# Patient Record
Sex: MALE | Race: BLACK OR AFRICAN AMERICAN | NOT HISPANIC OR LATINO | Employment: OTHER | URBAN - METROPOLITAN AREA
[De-identification: names, ages, dates, MRNs, and addresses within clinical notes are randomized per-mention and may not be internally consistent; named-entity substitution may affect disease eponyms.]

---

## 2017-04-22 ENCOUNTER — HOSPITAL ENCOUNTER (EMERGENCY)
Facility: HOSPITAL | Age: 67
Discharge: HOME | End: 2017-04-22
Attending: EMERGENCY MEDICINE
Payer: COMMERCIAL

## 2017-04-22 VITALS
SYSTOLIC BLOOD PRESSURE: 158 MMHG | TEMPERATURE: 98 F | OXYGEN SATURATION: 95 % | HEIGHT: 74 IN | WEIGHT: 145 LBS | HEART RATE: 92 BPM | BODY MASS INDEX: 18.61 KG/M2 | RESPIRATION RATE: 22 BRPM | DIASTOLIC BLOOD PRESSURE: 70 MMHG

## 2017-04-22 DIAGNOSIS — R05.9 COUGH: ICD-10-CM

## 2017-04-22 DIAGNOSIS — J44.1 COPD EXACERBATION: Primary | ICD-10-CM

## 2017-04-22 DIAGNOSIS — S49.91XA RIGHT SHOULDER INJURY, INITIAL ENCOUNTER: ICD-10-CM

## 2017-04-22 LAB
ALBUMIN SERPL BCP-MCNC: 3.7 G/DL
ALP SERPL-CCNC: 203 U/L
ALT SERPL W/O P-5'-P-CCNC: 7 U/L
ANION GAP SERPL CALC-SCNC: 10 MMOL/L
AST SERPL-CCNC: 18 U/L
BASOPHILS # BLD AUTO: 0 K/UL
BASOPHILS NFR BLD: 0.1 %
BILIRUB SERPL-MCNC: 0.7 MG/DL
BUN SERPL-MCNC: 9 MG/DL
CALCIUM SERPL-MCNC: 9.4 MG/DL
CHLORIDE SERPL-SCNC: 93 MMOL/L
CO2 SERPL-SCNC: 27 MMOL/L
CREAT SERPL-MCNC: 1 MG/DL
DIFFERENTIAL METHOD: ABNORMAL
EOSINOPHIL # BLD AUTO: 0.1 K/UL
EOSINOPHIL NFR BLD: 0.7 %
ERYTHROCYTE [DISTWIDTH] IN BLOOD BY AUTOMATED COUNT: 13.5 %
EST. GFR  (AFRICAN AMERICAN): >60 ML/MIN/1.73 M^2
EST. GFR  (NON AFRICAN AMERICAN): >60 ML/MIN/1.73 M^2
GLUCOSE SERPL-MCNC: 105 MG/DL
HCT VFR BLD AUTO: 34.3 %
HGB BLD-MCNC: 11.5 G/DL
LACTATE SERPL-SCNC: 1.3 MMOL/L
LYMPHOCYTES # BLD AUTO: 1.5 K/UL
LYMPHOCYTES NFR BLD: 16.7 %
MCH RBC QN AUTO: 32.3 PG
MCHC RBC AUTO-ENTMCNC: 33.5 %
MCV RBC AUTO: 96 FL
MONOCYTES # BLD AUTO: 0.2 K/UL
MONOCYTES NFR BLD: 2.1 %
NEUTROPHILS # BLD AUTO: 7.1 K/UL
NEUTROPHILS NFR BLD: 80.4 %
PLATELET # BLD AUTO: 632 K/UL
PMV BLD AUTO: 5.8 FL
POTASSIUM SERPL-SCNC: 4.2 MMOL/L
PROT SERPL-MCNC: 7.7 G/DL
RBC # BLD AUTO: 3.57 M/UL
SODIUM SERPL-SCNC: 130 MMOL/L
WBC # BLD AUTO: 8.8 K/UL

## 2017-04-22 PROCEDURE — 63600175 PHARM REV CODE 636 W HCPCS: Performed by: EMERGENCY MEDICINE

## 2017-04-22 PROCEDURE — 85025 COMPLETE CBC W/AUTO DIFF WBC: CPT

## 2017-04-22 PROCEDURE — 94640 AIRWAY INHALATION TREATMENT: CPT

## 2017-04-22 PROCEDURE — 36415 COLL VENOUS BLD VENIPUNCTURE: CPT

## 2017-04-22 PROCEDURE — 25000242 PHARM REV CODE 250 ALT 637 W/ HCPCS: Performed by: EMERGENCY MEDICINE

## 2017-04-22 PROCEDURE — 96365 THER/PROPH/DIAG IV INF INIT: CPT

## 2017-04-22 PROCEDURE — 87040 BLOOD CULTURE FOR BACTERIA: CPT

## 2017-04-22 PROCEDURE — 83605 ASSAY OF LACTIC ACID: CPT

## 2017-04-22 PROCEDURE — 80053 COMPREHEN METABOLIC PANEL: CPT

## 2017-04-22 PROCEDURE — 99284 EMERGENCY DEPT VISIT MOD MDM: CPT | Mod: 25

## 2017-04-22 PROCEDURE — 25000003 PHARM REV CODE 250: Performed by: EMERGENCY MEDICINE

## 2017-04-22 RX ORDER — MOXIFLOXACIN HYDROCHLORIDE 400 MG/250ML
400 INJECTION, SOLUTION INTRAVENOUS
Status: COMPLETED | OUTPATIENT
Start: 2017-04-22 | End: 2017-04-22

## 2017-04-22 RX ORDER — HYDROCODONE BITARTRATE AND ACETAMINOPHEN 7.5; 325 MG/15ML; MG/15ML
SOLUTION ORAL 4 TIMES DAILY PRN
COMMUNITY

## 2017-04-22 RX ORDER — HYDROCODONE BITARTRATE AND ACETAMINOPHEN 5; 325 MG/1; MG/1
1 TABLET ORAL
Status: COMPLETED | OUTPATIENT
Start: 2017-04-22 | End: 2017-04-22

## 2017-04-22 RX ORDER — BICALUTAMIDE 50 MG/1
50 TABLET, FILM COATED ORAL DAILY
COMMUNITY

## 2017-04-22 RX ORDER — QUETIAPINE FUMARATE 50 MG/1
50 TABLET, FILM COATED ORAL NIGHTLY
COMMUNITY

## 2017-04-22 RX ORDER — ALBUTEROL SULFATE 90 UG/1
2 AEROSOL, METERED RESPIRATORY (INHALATION) EVERY 4 HOURS PRN
Qty: 1 INHALER | Refills: 0 | Status: SHIPPED | OUTPATIENT
Start: 2017-04-22

## 2017-04-22 RX ORDER — MOXIFLOXACIN HYDROCHLORIDE 400 MG/1
400 TABLET ORAL DAILY
Qty: 7 TABLET | Refills: 0 | Status: SHIPPED | OUTPATIENT
Start: 2017-04-22 | End: 2017-04-29

## 2017-04-22 RX ORDER — IPRATROPIUM BROMIDE AND ALBUTEROL SULFATE 2.5; .5 MG/3ML; MG/3ML
3 SOLUTION RESPIRATORY (INHALATION)
Status: COMPLETED | OUTPATIENT
Start: 2017-04-22 | End: 2017-04-22

## 2017-04-22 RX ORDER — GUAIFENESIN 200 MG/1
400 TABLET ORAL EVERY 4 HOURS PRN
COMMUNITY

## 2017-04-22 RX ORDER — METHYLPREDNISOLONE 4 MG/1
TABLET ORAL
Qty: 1 PACKAGE | Refills: 0 | Status: SHIPPED | OUTPATIENT
Start: 2017-04-22 | End: 2017-05-13

## 2017-04-22 RX ADMIN — IPRATROPIUM BROMIDE AND ALBUTEROL SULFATE 3 ML: .5; 3 SOLUTION RESPIRATORY (INHALATION) at 04:04

## 2017-04-22 RX ADMIN — MOXIFLOXACIN HYDROCHLORIDE 400 MG: 400 INJECTION, SOLUTION INTRAVENOUS at 04:04

## 2017-04-22 RX ADMIN — HYDROCODONE BITARTRATE AND ACETAMINOPHEN 1 TABLET: 5; 325 TABLET ORAL at 05:04

## 2017-04-22 NOTE — ED AVS SNAPSHOT
[facility] [address]  [place] [address]               Billy Starks   2017  3:07 PM   ED    Description:  Male : 1950   Department:  [facility]           Your Care was Coordinated By:     Provider Role From To    Michael Gomes MD Attending Provider [phone] --      Reason for Visit     Cough     Shoulder Pain           Diagnoses this Visit        Comments    COPD exacerbation    -  Primary     Cough         Right shoulder injury, initial encounter           ED Disposition     None           To Do List           Follow-up Information     Follow up with your doctor. Call in 3 days.    Why:  As needed       These Medications        Disp Refills Start End    moxifloxacin (AVELOX ABC PACK) 400 mg tablet 7 tablet 0 2017    Take 1 tablet (400 mg total) by mouth once daily. - Oral    albuterol 90 mcg/actuation inhaler 1 Inhaler 0 2017     Inhale 2 puffs into the lungs every 4 (four) hours as needed for Wheezing or Shortness of Breath. - Inhalation    methylPREDNISolone (MEDROL DOSEPACK) 4 mg tablet 1 Package 0 2017    Take as directed      [facility] On Call     [facility] On Call Nurse Care Line -  Assistance  Unless otherwise directed by your provider, please contact [facility], our nurse care line that is available for  assistance.     Registered nurses in the [facility] provide: appointment scheduling, clinical advisement, health education, and other advisory services.  Call: [phone] (toll free)               Medications           Message regarding Medications     Verify the changes and/or additions to your medication regime listed below are the same as discussed with your clinician today.  If any of these changes or additions are incorrect, please notify your healthcare provider.        START taking these NEW medications        Refills    moxifloxacin (AVELOX ABC  PACK) 400 mg tablet 0    Sig: Take 1 tablet (400 mg total) by mouth once daily.    Class: Print    Route: Oral    albuterol 90 mcg/actuation inhaler 0    Sig: Inhale 2 puffs into the lungs every 4 (four) hours as needed for Wheezing or Shortness of Breath.    Class: Print    Route: Inhalation    methylPREDNISolone (MEDROL DOSEPACK) 4 mg tablet 0    Sig: Take as directed    Class: Print      These medications were administered today        Dose Freq    albuterol-ipratropium 2.5mg-0.5mg/3mL nebulizer solution 3 mL 3 mL Every 5 min    Sig: Take 3 mLs by nebulization every 5 (five) minutes.    Class: Normal    Route: Nebulization    moxifloxacin 400 mg/250 mL IVPB 400 mg 400 mg ED 1 Time    Sig: Inject 250 mLs (400 mg total) into the vein ED 1 Time.    Class: Normal    Route: Intravenous           Verify that the below list of medications is an accurate representation of the medications you are currently taking.  If none reported, the list may be blank. If incorrect, please contact your healthcare provider. Carry this list with you in case of emergency.           Current Medications     bicalutamide (CASODEX) 50 MG Tab Take 50 mg by mouth once daily.    guaifenesin 200 mg tablet Take 400 mg by mouth every 4 (four) hours as needed for Congestion.    hydrocodone-acetaminophen (HYCET) solution 7.5-325 mg/15mL Take by mouth 4 (four) times daily as needed for Pain.    quetiapine (SEROQUEL) 50 MG tablet Take 50 mg by mouth every evening.    albuterol 90 mcg/actuation inhaler Inhale 2 puffs into the lungs every 4 (four) hours as needed for Wheezing or Shortness of Breath.    methylPREDNISolone (MEDROL DOSEPACK) 4 mg tablet Take as directed    moxifloxacin (AVELOX ABC PACK) 400 mg tablet Take 1 tablet (400 mg total) by mouth once daily.    moxifloxacin 400 mg/250 mL IVPB 400 mg Inject 250 mLs (400 mg total) into the vein ED 1 Time.           Clinical Reference Information           Your Vitals Were     BP Pulse Temp Resp Height  "Weight    154/81 (BP Location: Right arm, Patient Position: Sitting) 81 98.3 °F (36.8 °C) (Oral) 20 6' 2" (1.88 m) 65.8 kg (145 lb)    SpO2 BMI             95% 18.62 kg/m2         Allergies as of 4/22/2017     No Known Allergies      Immunizations Administered on Date of Encounter - 4/22/2017     None      ED Micro, Lab, POCT     Start Ordered       Status Ordering Provider    04/22/17 1530 04/22/17 1529  Complete Blood Count (CBC)  STAT      Final result     04/22/17 1530 04/22/17 1529  Comprehensive Metabolic Panel (CMP)  STAT      Final result     04/22/17 1530 04/22/17 1529  Blood culture #1 **CANNOT BE ORDERED STAT**  Once      In process     04/22/17 1530 04/22/17 1529  Blood culture #2 **CANNOT BE ORDERED STAT**  Once      In process     04/22/17 1530 04/22/17 1529  Lactic acid, plasma  STAT      Final result       ED Imaging Orders     Start Ordered       Status Ordering Provider    04/22/17 1705 04/22/17 1704  X-Ray Shoulder Trauma Right  1 time imaging      Ordered     04/22/17 1530 04/22/17 1529  X-Ray Chest PA And Lateral  1 time imaging      Final result       Discharge References/Attachments     COPD FLARE (ENGLISH)      [facility] Sign-Up     Activating your [facility] account is as easy as 1-2-3!     1) Visit [email or web address], select Sign Up Now, enter this activation code and your date of birth, then select Next.  [email or web address]  Expires: 6/6/2017  5:07 PM      2) Create a username and password to use when you visit [facility] in the future and select a security question in case you lose your password and select Next.    3) Enter your e-mail address and click Sign Up!    Additional Information  If you have questions, please e-mail [email or web address] or call [phone] to talk to our [facility] staff. Remember, [facility] is NOT to be used for urgent needs. For medical emergencies, dial 911.          [facility] complies with applicable Federal civil rights laws and does not " discriminate on the basis of race, color, national origin, age, disability, or sex.        Language Assistance Services     ATTENTION: Language assistance services are available, free of charge. Please call [phone].      ATENCIÓN: Si ulises cason, tiene a jenkins disposición servicios gratuitos de asistencia lingüística. Llame al [phone].     CHÚ Ý: N?u b?n nói Ti?ng Vi?t, có các d?ch v? h? tr? ngôn ng? mi?n phí dành cho b?n. G?i s? [phone].

## 2017-04-22 NOTE — ED PROVIDER NOTES
Encounter Date: 4/22/2017    SCRIBE #1 NOTE: I, Mayuri Curran, am scribing for, and in the presence of, Dr. Gomes.       History     Chief Complaint   Patient presents with    Cough     productive cough, green sputum.    Shoulder Pain     Fell 2 weeks ago, R shoulder and elbow pain.     Review of patient's allergies indicates:  No Known Allergies  HPI Comments:   04/22/2017 3:22 PM     Chief Complaint: Cough, shoulder pain, & elbow pain      The patient is a 66 y.o. male with prostate cancer and undergoing chemotherapy who presents to the ED with an onset of persistent productive cough with thick sputum for the last 2 weeks. He states that the sputum has changed colors from green to yellow to now red. The patient also endorses worsening right shoulder pain and right elbow pain over the last 2 weeks after he fell and hit the TV stand. Currently, the pain is rated 7/10 but is temporarily alleviated with hot showers. The patient denies being on a recent course of antibiotics, fever, chills, or any other symptoms at this time. No SHx noted.     The history is provided by the patient.     History reviewed. No pertinent past medical history.  History reviewed. No pertinent surgical history.  History reviewed. No pertinent family history.  Social History   Substance Use Topics    Smoking status: Current Every Day Smoker     Packs/day: 1.00     Types: Cigarettes    Smokeless tobacco: None    Alcohol use Yes     Review of Systems   Constitutional: Negative for chills and fever.   HENT: Negative for nosebleeds.    Eyes: Negative for visual disturbance.   Respiratory: Positive for cough (productive).    Cardiovascular: Negative for chest pain and palpitations.   Gastrointestinal: Negative for abdominal pain, diarrhea, nausea and vomiting.   Genitourinary: Negative for dysuria and hematuria.   Musculoskeletal: Positive for arthralgias (right shoulder and right elbow).   Skin: Negative for rash.   Neurological: Negative for  seizures, syncope and headaches.     Physical Exam   Initial Vitals   BP Pulse Resp Temp SpO2   04/22/17 1503 04/22/17 1503 04/22/17 1503 04/22/17 1503 04/22/17 1503   154/81 95 14 98.3 °F (36.8 °C) 95 %     Physical Exam    Nursing note and vitals reviewed.  Constitutional: He appears well-developed and well-nourished. He is not diaphoretic. No distress.   HENT:   Head: Normocephalic and atraumatic.   Mouth/Throat: Oropharynx is clear and moist.   Eyes: Conjunctivae are normal. Pupils are equal, round, and reactive to light.   Neck: Normal range of motion. Neck supple.   Cardiovascular: Normal rate, regular rhythm, normal heart sounds and intact distal pulses.   Pulmonary/Chest: No respiratory distress. He has wheezes. He has no rhonchi. He has no rales.   Mild expiratory wheezing in the right upper lung field.    Abdominal: Soft. Bowel sounds are normal. He exhibits no distension. There is no tenderness.   Musculoskeletal: Normal range of motion. He exhibits no edema or tenderness.   Lymphadenopathy:     He has no cervical adenopathy.   Neurological: He is alert and oriented to person, place, and time. He has normal strength.   Skin: Skin is warm and dry.   Psychiatric: He has a normal mood and affect. Thought content normal.       ED Course   Procedures  Labs Reviewed   CBC W/ AUTO DIFFERENTIAL - Abnormal; Notable for the following:        Result Value    RBC 3.57 (*)     Hemoglobin 11.5 (*)     Hematocrit 34.3 (*)     MCH 32.3 (*)     Platelets 632 (*)     MPV 5.8 (*)     Mono # 0.2 (*)     Gran% 80.4 (*)     Lymph% 16.7 (*)     Mono% 2.1 (*)     All other components within normal limits   COMPREHENSIVE METABOLIC PANEL - Abnormal; Notable for the following:     Sodium 130 (*)     Chloride 93 (*)     Alkaline Phosphatase 203 (*)     ALT 7 (*)     All other components within normal limits   CULTURE, BLOOD   CULTURE, BLOOD   LACTIC ACID, PLASMA     Imaging Results         X-Ray Shoulder Trauma Right (Final result)  Result time:  04/23/17 08:10:07    Final result by Leno Rasmussen MD (04/23/17 08:10:07)    Impression:      1.  No radiographic evidence for acute traumatic right shoulder injury.      Electronically signed by: Leno Rasmussen MD  Date:     04/23/17  Time:    08:10     Narrative:    Comparison: None    Technique: AP internally and externally rotated and scapular Y radiographs of the right shoulder.    Findings: No acute fracture or dislocation is seen. No focal osseous lesion is seen. No radiopaque foreign body is seen. No bone erosion is seen. Joint spaces are maintained. The visualized right lung is clear. There are mildly increased chronic interstitial markings in the right lung.            X-Ray Chest PA And Lateral (Final result) Result time:  04/22/17 15:53:43    Final result by Lneo Rasmussen MD (04/22/17 15:53:43)    Impression:      1.  No acute radiographic findings in the chest.  2. Pulmonary emphysema.        Electronically signed by: Leno Rasmussen MD  Date:     04/22/17  Time:    15:53     Narrative:    Comparison: None available    Technique: PA and lateral chest radiographs.    Findings:There is flattening of the diaphragm and pulmonary hyperinflation/hyperlucency. The cardiac size is within normal limits. No alveolar consolidation, lateral effusion or pneumothorax is seen. There is atherosclerosis in the aortic arch. Mild degenerative disc disease is seen. No radiographically apparent pulmonary nodule is seen. Nipple shadows project over the lower chest on the PA view.                  Medical Decision Making:   History:   Old Medical Records: I decided to obtain old medical records.  Initial Assessment:   This is an emergent evaluation of a 66 y.o.male patient with presentation of cough and shoulder pain.   Initial differentials include but are not limited to: Bronchitis, pneumonia, fracture or subluxation.   Plan: Chest x-ray, EKG, labs including blood cultures and lactic acid,  IV antibiotics  Clinical Tests:   Lab Tests: Reviewed and Ordered  Radiological Study: Reviewed and Ordered            Scribe Attestation:   Scribe #1: I performed the above scribed service and the documentation accurately describes the services I performed. I attest to the accuracy of the note.    Attending Attestation:           Physician Attestation for Scribe:  Physician Attestation Statement for Scribe #1: I, Dr. Gomes, reviewed documentation, as scribed by Mayuri Curran in my presence, and it is both accurate and complete.         Attending ED Notes:   Workup unremarkable. Will continue abx as outpatient. Pt afebrile, no acute respiratory distress, not hypoxic. Results, clinical impression and rx discussed with patient.  Pt is to call for follow up with PCP in 2-3 days or return to the ED for any new or worsening symptoms, or for any other concerns. Return precautions given. Pt expressed understanding.           ED Course     Clinical Impression:     1. COPD exacerbation    2. Cough    3. Right shoulder injury, initial encounter          Disposition:   Disposition: Discharged  Condition: Stable       Michael Gomes MD  04/25/17 0046

## 2017-04-28 LAB
BACTERIA BLD CULT: NORMAL
BACTERIA BLD CULT: NORMAL

## 2017-05-10 ENCOUNTER — TELEPHONE (OUTPATIENT)
Dept: PAIN MEDICINE | Facility: CLINIC | Age: 67
End: 2017-05-10

## 2017-05-10 NOTE — TELEPHONE ENCOUNTER
----- Message from Vivian Lezama sent at 5/10/2017 10:30 AM CDT -----  Contact: Patient  Patient states that he is in a lot of pain in his left shoulder and would like to be seen sooner than 05/26/2017.  He is also asking questions about the pain management clinic.  Can you please call [phone].  Thank you

## 2017-05-15 ENCOUNTER — HOSPITAL ENCOUNTER (EMERGENCY)
Facility: HOSPITAL | Age: 67
Discharge: HOME | End: 2017-05-15
Attending: EMERGENCY MEDICINE
Payer: OTHER GOVERNMENT

## 2017-05-15 VITALS
RESPIRATION RATE: 16 BRPM | BODY MASS INDEX: 17.97 KG/M2 | WEIGHT: 140 LBS | DIASTOLIC BLOOD PRESSURE: 78 MMHG | SYSTOLIC BLOOD PRESSURE: 138 MMHG | HEIGHT: 74 IN | TEMPERATURE: 98 F | HEART RATE: 96 BPM

## 2017-05-15 DIAGNOSIS — M79.601 RIGHT ARM PAIN: Primary | ICD-10-CM

## 2017-05-15 PROCEDURE — 63600175 PHARM REV CODE 636 W HCPCS: Performed by: EMERGENCY MEDICINE

## 2017-05-15 PROCEDURE — 96372 THER/PROPH/DIAG INJ SC/IM: CPT | Mod: 25

## 2017-05-15 PROCEDURE — 99283 EMERGENCY DEPT VISIT LOW MDM: CPT | Mod: 25

## 2017-05-15 RX ORDER — MORPHINE SULFATE 2 MG/ML
6 INJECTION, SOLUTION INTRAMUSCULAR; INTRAVENOUS
Status: DISCONTINUED | OUTPATIENT
Start: 2017-05-15 | End: 2017-05-15

## 2017-05-15 RX ORDER — MORPHINE SULFATE 10 MG/ML
INJECTION INTRAMUSCULAR; INTRAVENOUS; SUBCUTANEOUS
Status: DISCONTINUED
Start: 2017-05-15 | End: 2017-05-15 | Stop reason: HOSPADM

## 2017-05-15 RX ORDER — MORPHINE SULFATE 10 MG/ML
10 INJECTION INTRAMUSCULAR; INTRAVENOUS; SUBCUTANEOUS
Status: DISCONTINUED | OUTPATIENT
Start: 2017-05-15 | End: 2017-05-15

## 2017-05-15 RX ORDER — MORPHINE SULFATE 10 MG/ML
6 INJECTION, SOLUTION INTRAMUSCULAR; INTRAVENOUS
Status: COMPLETED | OUTPATIENT
Start: 2017-05-15 | End: 2017-05-15

## 2017-05-15 RX ADMIN — MORPHINE SULFATE 6 MG: 10 INJECTION INTRAMUSCULAR; INTRAVENOUS; SUBCUTANEOUS at 03:05
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
## 2017-05-15 NOTE — ED AVS SNAPSHOT
[facility] [address]  [place] [address]               Billy Starks   5/15/2017  2:10 PM   ED    Description:  Male : 1950   Department:  [facility]           Your Care was Coordinated By:     Provider Role From To    Rigoberto Boyd MD Attending Provider 05/15/17 1424 --    Eileen Juárez PA-C Physician Assistant 05/15/17 5268 --      Reason for Visit     Arm Pain           Diagnoses this Visit        Comments    Right arm pain    -  Primary       ED Disposition     ED Disposition Condition Comment    Discharge             To Do List           Follow-up Information     Please follow up.    Why:  Call the [facility] at [phone] to establish care with a primary care provider        Schedule an appointment as soon as possible for a visit with Humberto Salmeron MD.    Specialties:  Sports Medicine, Orthopedic Surgery    Contact information:    [address] [place] [address] [address] [place] [address]  [phone]          Follow up with [facility].    Specialty:  Emergency Medicine    Why:  As needed    Contact information:    [address] [place] [address] [place]  [facility] [address] [phone]      [facility]     [facility] Nurse Care Line -  Assistance  Unless otherwise directed by your provider, please contact [facility], our nurse care line that is available for  assistance.     Registered nurses in the [facility] provide: appointment scheduling, clinical advisement, health education, and other advisory services.  Call: [phone] (toll free)               Medications           Message regarding Medications     Verify the changes and/or additions to your medication regime listed below are the same as discussed with your clinician today.  If any of these changes or additions are incorrect, please notify your healthcare provider.        These medications were administered  "today        Dose Freq    morphine injection 6 mg 6 mg ED 1 Time    Sig: Inject 0.6 mLs (6 mg total) into the muscle ED 1 Time.    Class: Normal    Route: Intramuscular    morphine 10 mg/mL injection      Notes to Pharmacy: Created by cabinet override           Verify that the below list of medications is an accurate representation of the medications you are currently taking.  If none reported, the list may be blank. If incorrect, please contact your healthcare provider. Carry this list with you in case of emergency.           Current Medications     albuterol 90 mcg/actuation inhaler Inhale 2 puffs into the lungs every 4 (four) hours as needed for Wheezing or Shortness of Breath.    bicalutamide (CASODEX) 50 MG Tab Take 50 mg by mouth once daily.    guaifenesin 200 mg tablet Take 400 mg by mouth every 4 (four) hours as needed for Congestion.    hydrocodone-acetaminophen (HYCET) solution 7.5-325 mg/15mL Take by mouth 4 (four) times daily as needed for Pain.    quetiapine (SEROQUEL) 50 MG tablet Take 50 mg by mouth every evening.           Clinical Reference Information           Your Vitals Were     BP Pulse Temp Resp Height Weight    138/78 (BP Location: Left arm, Patient Position: Sitting) 96 97.8 °F (36.6 °C) (Oral) 16 6' 2" (1.88 m) 63.5 kg (140 lb)    BMI                17.97 kg/m2          Allergies as of 5/15/2017     No Known Allergies      Immunizations Administered on Date of Encounter - 5/15/2017     None      ED Micro, Lab, POCT     None      ED Imaging Orders     None        Discharge Instructions       Call and make an appointment with orthopedics as soon as possible.  Continue your pain mediation as needed. You can also take ibuprofen.  See your primary care provider in one week.  Return to ED for new or worsening symptoms.     Discharge References/Attachments     RICE (ENGLISH)    MUSCLE STRAIN, EXTREMITY (ENGLISH)      Your Scheduled Appointments     May 23, 2017 10:00 AM CDT   New Patient - Pain " with MD [place] Carrera - Pain Management ([facility] [place] [facility])    [address] [place] [address] [address]  [place] [id number] [phone]              [facility] Sign-Up     Activating your [facility] account is as easy as 1-2-3!     1) Visit [email or web address], select Sign Up Now, enter this activation code and your date of birth, then select Next.  [email or web address]  Expires: 6/6/2017  5:07 PM      2) Create a username and password to use when you visit [facility] in the future and select a security question in case you lose your password and select Next.    3) Enter your e-mail address and click Sign Up!    Additional Information  If you have questions, please e-mail [email or web address] or call [phone] to talk to our [facility] staff. Remember, [facility] is NOT to be used for urgent needs. For medical emergencies, dial 911.         Smoking Cessation     If you would like to quit smoking:   You may be eligible for free services if you are a [place] resident and started smoking cigarettes before September 1, 1988.  Call the [employer]oking [employer]ess[employer] ([employer]) toll free at (782) 013-1766 or (979) 562-7848.   Call 5-632-QUIT-NOW if you do not meet the above criteria.   Contact us via email: [email or web address]   View our website for more information: [email or web address]         [facility] complies with applicable Federal civil rights laws and does not discriminate on the basis of race, color, national origin, age, disability, or sex.        Language Assistance Services     ATTENTION: Language assistance services are available, free of charge. Please call [phone].      ATENCIÓN: Si habla español, tiene a jenkins disposición servicios gratuitos de asistencia lingüística. Llame al [phone].     CHÚ Ý: N?u b?n nói Ti?ng Vi?t, có các d?ch v? h? tr? ngôn ng? mi?n phí dành cho b?n. G?i s? [phone].

## 2017-05-15 NOTE — DISCHARGE INSTRUCTIONS
Call and make an appointment with orthopedics as soon as possible.  Continue your pain mediation as needed. You can also take ibuprofen.  See your primary care provider in one week.  Return to ED for new or worsening symptoms.